# Patient Record
Sex: FEMALE | Race: WHITE | NOT HISPANIC OR LATINO
[De-identification: names, ages, dates, MRNs, and addresses within clinical notes are randomized per-mention and may not be internally consistent; named-entity substitution may affect disease eponyms.]

---

## 2024-02-15 ENCOUNTER — NON-APPOINTMENT (OUTPATIENT)
Age: 54
End: 2024-02-15

## 2024-02-16 ENCOUNTER — OUTPATIENT (OUTPATIENT)
Dept: OUTPATIENT SERVICES | Facility: HOSPITAL | Age: 54
LOS: 1 days | End: 2024-02-16

## 2024-02-16 ENCOUNTER — TRANSCRIPTION ENCOUNTER (OUTPATIENT)
Age: 54
End: 2024-02-16

## 2024-02-16 DIAGNOSIS — E04.1 NONTOXIC SINGLE THYROID NODULE: ICD-10-CM

## 2024-02-16 PROBLEM — Z00.00 ENCOUNTER FOR PREVENTIVE HEALTH EXAMINATION: Status: ACTIVE | Noted: 2024-02-16

## 2024-02-20 ENCOUNTER — APPOINTMENT (OUTPATIENT)
Age: 54
End: 2024-02-20
Payer: COMMERCIAL

## 2024-02-20 VITALS
BODY MASS INDEX: 24.33 KG/M2 | HEIGHT: 67 IN | WEIGHT: 155 LBS | DIASTOLIC BLOOD PRESSURE: 80 MMHG | SYSTOLIC BLOOD PRESSURE: 122 MMHG | HEART RATE: 85 BPM | OXYGEN SATURATION: 99 % | TEMPERATURE: 98 F

## 2024-02-20 DIAGNOSIS — Z80.3 FAMILY HISTORY OF MALIGNANT NEOPLASM OF BREAST: ICD-10-CM

## 2024-02-20 DIAGNOSIS — Z80.1 FAMILY HISTORY OF MALIGNANT NEOPLASM OF TRACHEA, BRONCHUS AND LUNG: ICD-10-CM

## 2024-02-20 DIAGNOSIS — Z82.49 FAMILY HISTORY OF ISCHEMIC HEART DISEASE AND OTHER DISEASES OF THE CIRCULATORY SYSTEM: ICD-10-CM

## 2024-02-20 PROCEDURE — 99205 OFFICE O/P NEW HI 60 MIN: CPT

## 2024-02-20 NOTE — HISTORY OF PRESENT ILLNESS
[de-identified] : CT chest 24 revealed dominant left thyroid nodule measuring about 2.3cm. Thyroid US revealed 2.7 x 1.6 x 2.3cm hypoechoic nodule.  FNA was atypia of undetermined significance. BRAF and neuroendocrine testing on the sample was reportedly negative. She has been referred to endocrinology, has an appointment in April.  Patient is feeling generally well overall, with no acute complaints aside from her thyroid as noted above.  She denies any history of thyroid disease. She is up to date with breast cancer screening; has not yet had a colonoscopy. She has not had genetic screening.  PSHx: left oophorectomy  Family history: lung cancer (father, smoker,  age mid-40s) breast cancer (two maternal aunts, both )  Social history: Patient is an active smoker (10 cigarettes/day), restarting Chantix in the near future

## 2024-02-20 NOTE — RESULTS/DATA
[FreeTextEntry1] : Ms. Merritt is a pleasant 53 year-old woman with a left [place] grade III thyroid nodule, here for evaluation.

## 2024-02-20 NOTE — CONSULT LETTER
[Dear  ___] : Dear  [unfilled], [Consult Letter:] : I had the pleasure of evaluating your patient, [unfilled]. [Please see my note below.] : Please see my note below. [Consult Closing:] : Thank you very much for allowing me to participate in the care of this patient.  If you have any questions, please do not hesitate to contact me. [Sincerely,] : Sincerely, [FreeTextEntry2] : Dr. Gisell Cabrera [FreeTextEntry3] : Manolo Paulino MD

## 2024-02-20 NOTE — ADDENDUM
[FreeTextEntry1] : I, Kuldeep Garces, documented this note as a scribe on behalf of Dr. Manolo Paulino on 02/20/2024.

## 2024-02-20 NOTE — END OF VISIT
[FreeTextEntry3] : All medical record entries made by the Scribe were at my, Dr. Manolo Paulino, direction and personally dictated by me on 02/20/2024. I have reviewed the chart and agree that the record accurately reflects my personal performance of the history, physical exam, assessment and plan. I have also personally directed, reviewed, and agreed with the chart. [Time Spent: ___ minutes] : I have spent [unfilled] minutes of time on the encounter.

## 2024-04-02 LAB — HBA1C MFR BLD HPLC: 5

## 2024-04-09 ENCOUNTER — APPOINTMENT (OUTPATIENT)
Dept: ENDOCRINOLOGY | Facility: CLINIC | Age: 54
End: 2024-04-09
Payer: COMMERCIAL

## 2024-04-09 VITALS
WEIGHT: 159 LBS | DIASTOLIC BLOOD PRESSURE: 82 MMHG | OXYGEN SATURATION: 97 % | HEIGHT: 67 IN | BODY MASS INDEX: 24.96 KG/M2 | HEART RATE: 90 BPM | SYSTOLIC BLOOD PRESSURE: 123 MMHG

## 2024-04-09 DIAGNOSIS — Z87.891 PERSONAL HISTORY OF NICOTINE DEPENDENCE: ICD-10-CM

## 2024-04-09 DIAGNOSIS — E04.1 NONTOXIC SINGLE THYROID NODULE: ICD-10-CM

## 2024-04-09 DIAGNOSIS — Z77.22 CONTACT WITH AND (SUSPECTED) EXPOSURE TO ENVIRONMENTAL TOBACCO SMOKE (ACUTE) (CHRONIC): ICD-10-CM

## 2024-04-09 DIAGNOSIS — J45.909 UNSPECIFIED ASTHMA, UNCOMPLICATED: ICD-10-CM

## 2024-04-09 DIAGNOSIS — Z78.9 OTHER SPECIFIED HEALTH STATUS: ICD-10-CM

## 2024-04-09 DIAGNOSIS — Z82.49 FAMILY HISTORY OF ISCHEMIC HEART DISEASE AND OTHER DISEASES OF THE CIRCULATORY SYSTEM: ICD-10-CM

## 2024-04-09 DIAGNOSIS — Z83.3 FAMILY HISTORY OF DIABETES MELLITUS: ICD-10-CM

## 2024-04-09 PROCEDURE — 99204 OFFICE O/P NEW MOD 45 MIN: CPT

## 2024-04-09 RX ORDER — MONTELUKAST SODIUM 10 MG/1
10 TABLET, FILM COATED ORAL
Refills: 0 | Status: ACTIVE | COMMUNITY

## 2024-04-09 RX ORDER — BUDESONIDE AND FORMOTEROL FUMARATE DIHYDRATE 160; 4.5 UG/1; UG/1
AEROSOL RESPIRATORY (INHALATION)
Refills: 0 | Status: ACTIVE | COMMUNITY

## 2024-04-09 RX ORDER — VARENICLINE 1 MG/1
TABLET, FILM COATED ORAL
Refills: 0 | Status: ACTIVE | COMMUNITY

## 2024-04-09 RX ORDER — ALBUTEROL 90 MCG
90 AEROSOL (GRAM) INHALATION
Refills: 0 | Status: ACTIVE | COMMUNITY

## 2024-04-09 NOTE — ASSESSMENT
[FreeTextEntry1] :  - Thyroid nodule Clinically and biochemically euthyroid  llp- 2.7 cm TR-4 s/p s/p FNA: Atypia of undetermined significance; with very low risk of malignancy on molecular testing  Plan:  Thyroid US in 6 month (Aug 2024) for interval growth evaluation, if stable then yearly.   Recently quitting smoking- second try. Encouraged avoiding triggers and exercising.

## 2024-04-09 NOTE — HISTORY OF PRESENT ILLNESS
[FreeTextEntry1] : 53 year old women with h/o smoking (recently quitted) and thyroid nodule presented to [facility] care.   Lab Jan 2024- TSH 0.5, A1C 5%, Vit D 30  Thyroid US- Feb 2024 lup- 0.5 cm isoechoic nodule, TR-3 llp- 2.7 cm hypoechoic nodule, TR-4 (s/p FNA: Atypia of undetermined significance; with very low risk of malignancy on molecular testing)   Pt was incidentally found to have thyroid nodule.  Clinically euthyroid.  No compressive symptoms.  No h/o neck radiation.  No FHx thyroid cancer.

## 2024-08-05 ENCOUNTER — NON-APPOINTMENT (OUTPATIENT)
Age: 54
End: 2024-08-05

## 2024-08-24 LAB
T3FREE SERPL-MCNC: 2.74 PG/ML
T4 FREE SERPL-MCNC: 1.3 NG/DL
TSH SERPL-ACNC: 1.41 UIU/ML

## 2024-08-26 ENCOUNTER — NON-APPOINTMENT (OUTPATIENT)
Age: 54
End: 2024-08-26

## 2024-08-28 ENCOUNTER — APPOINTMENT (OUTPATIENT)
Dept: ENDOCRINOLOGY | Facility: CLINIC | Age: 54
End: 2024-08-28
Payer: COMMERCIAL

## 2024-08-28 VITALS
WEIGHT: 159 LBS | SYSTOLIC BLOOD PRESSURE: 132 MMHG | BODY MASS INDEX: 24.96 KG/M2 | HEIGHT: 67 IN | DIASTOLIC BLOOD PRESSURE: 88 MMHG | RESPIRATION RATE: 95 BRPM | TEMPERATURE: 98.5 F | HEART RATE: 90 BPM

## 2024-08-28 DIAGNOSIS — E04.1 NONTOXIC SINGLE THYROID NODULE: ICD-10-CM

## 2024-08-28 PROCEDURE — 99214 OFFICE O/P EST MOD 30 MIN: CPT

## 2024-08-28 NOTE — ASSESSMENT
[FreeTextEntry1] : - Thyroid nodule Clinically and biochemically euthyroid  llp- 2.7 cm TR-4 s/p s/p FNA: Atypia of undetermined significance; with very low risk of malignancy on molecular testing  Plan:  Thyroid US in 1 year   - Recently incidentally found pancreatic cyst No prior h/o DM Plan for MRI Following with GI  Recently quitting smoking- second try. Encouraged avoiding triggers and exercising.   Follow up in 1 year

## 2024-08-28 NOTE — HISTORY OF PRESENT ILLNESS
[FreeTextEntry1] : 53 year old women with h/o smoking (recently quitted) and thyroid nodule presented for follow up.  Lab  Aug 2024- TSH 1.4, Free T4 1.3, Free T3 2.7 Jan 2024- TSH 0.5, A1C 5%, Vit D 30  Imaging Thyroid US- Aug 2024 llp- 2.7 x 1.8 x 2.3 cm hypoechoic heterogeneous solid nodule TR 4- stable   Thyroid US- Feb 2024 lup- 0.5 cm isoechoic nodule, TR-3 llp- 2.7 cm hypoechoic nodule, TR-4 (s/p FNA: Atypia of undetermined significance; with very low risk of malignancy on molecular testing)  Aug 2024- Abdominal CT 1. Enlarged liver with few tiny hepatic cysts. 2. Bilateral renal cysts. 3. Incidental duodenal diverticulum. Colon is unremarkable. No wall thickening, mass or inflammatory changes. No adenopathy. 4. 6 mm hypodensity in the pancreas, likely a small cyst. Suggest complete characterization with MRI/MRCP. 5. Mixed density structure in the right side of the pelvis containing fat likely the right ovary with a small dermoid. Left ovary not identified presumably removed as per history.  Pt was incidentally found to have thyroid nodule. Clinically euthyroid. No compressive symptoms. No h/o neck radiation. No FHx thyroid cancer.  Following colonoscopy patient was found to have pancreatic cyst.